# Patient Record
Sex: MALE | Race: WHITE | Employment: OTHER | URBAN - METROPOLITAN AREA
[De-identification: names, ages, dates, MRNs, and addresses within clinical notes are randomized per-mention and may not be internally consistent; named-entity substitution may affect disease eponyms.]

---

## 2017-05-30 ENCOUNTER — TRANSFERRED RECORDS (OUTPATIENT)
Dept: HEALTH INFORMATION MANAGEMENT | Facility: CLINIC | Age: 77
End: 2017-05-30

## 2017-06-22 ENCOUNTER — HOSPITAL ENCOUNTER (OUTPATIENT)
Dept: CARDIAC REHAB | Facility: CLINIC | Age: 77
End: 2017-06-22
Attending: PHYSICIAN ASSISTANT
Payer: MEDICARE

## 2017-06-22 VITALS — BODY MASS INDEX: 25.31 KG/M2 | HEIGHT: 68 IN | WEIGHT: 167 LBS

## 2017-06-22 PROCEDURE — 93797 PHYS/QHP OP CAR RHAB WO ECG: CPT | Mod: 59 | Performed by: REHABILITATION PRACTITIONER

## 2017-06-22 PROCEDURE — 40000116 ZZH STATISTIC OP CR VISIT: Performed by: REHABILITATION PRACTITIONER

## 2017-06-22 PROCEDURE — 40000575 ZZH STATISTIC OP CARDIAC VISIT #2: Performed by: REHABILITATION PRACTITIONER

## 2017-06-22 PROCEDURE — 93798 PHYS/QHP OP CAR RHAB W/ECG: CPT | Performed by: REHABILITATION PRACTITIONER

## 2017-06-22 ASSESSMENT — 6 MINUTE WALK TEST (6MWT)
FEMALE CALC: 1391.07
TOTAL DISTANCE WALKED (FT): 973
PREDICTED: 1611.69
MALE CALC: 1601.92
GENDER SELECTION: MALE

## 2017-06-22 NOTE — PROGRESS NOTES
Rod Harris  77 year old 06/22/17 1100   Session   Session Initial Evaluation and Exercise Prescription   Certified through this date 07/21/17   Cardiac Rehab Assessment   Cardiac Rehab Assessment Pt was diagnosed with ischemic three vessel coronary artery disease and had coronary artery bypass x4 with left internal mammary artery to LAD, saphenous vein to intermediate marginal, saphenous vein to first diagonal, saphenous vein from aorta to posterior descending branch of right. Pt has past medical history of hyperlipidemia, GERD, and suggested CAD with ischemic cardiomyopathy. Patient's main concern is his SOB, fatigue, his last hemaglobin was 9.1 which may help explain fatigue and low energy level. Pt. denies H/O depression does have flat affect and scores high on PHQ9 today. Skilled therapy is recommenSded for monitoring ekg changes with a stable cv response and incorporating education classes on exercise principles and nutrition    The patient's history and clinical status including hemodynamics and ECG were evaluated.  The patient was assessed to be stable and appropriate to begin exercise.   The patient's functional capacity and exercise prescription were determined by the completion of the 6 minute walk test.  See results below.  The patient was oriented to the program.  Risk factor profile was completed. Goals and objectives were discussed. CV response was WNL. No symptoms,  or pain were reported pt c/o fatigue. Good prognosis for reaching above goals.  Plan to progress to 30-40 minutes of exercise prior to discharge from cardiac rehab.  Initial THR of 20-30 beats above RHR; Effort rating of 4-6.  Initiate muscle conditioning as appropriate.  Provide risk factor education and behavior change counseling.    General Information   Treatment Diagnosis Coronary Artery Bypass Surgery   Date of Treatment Diagnosis 05/30/17   Secondary Treatment Diagnosis Other (see comments)   Comorbidities None   Other Medical  "History ASHD,Ischemic  cardiomyopathy, GERD, thrombocytopenia, acute pericarditis, acute blood loss as cause of postoperative anemia, hyperlipidemia, arthitis    Hospital Location [facility] Discharge Date 06/05/17   Signs and Symptoms Post Hospital Discharge Fatigue;Lightheadedness;Palpitations;SOB   Comments slient MI a yr ago, low hemoglobin    Outpatient Cardiac Rehab Start Date 06/22/17   Primary Physician Franklin Nowak   Primary Physician Follow Up Completed   Surgeon Dr. Uche Bowen   Surgeon Follow Up Scheduled   Cardiologist Dr. Wheeler   Cardiologist Follow Up Scheduled   Ejection Fraction 55-60%   Risk Stratification Low  (high PHQ9 score)   Living and Work Status    Living Arrangements and Social Status house   Support System Live with an adult   Preventative Medications   CMS recommended medications Ace inhibitors;Antiplatelets;Beta Blocker;Lipid Lowering;Influenza vaccination;Pneumonia vaccination   Falls Screen   Have you fallen two or more times in the past year? No   Have you fallen and had an injury in the past year? No   Pain   Patient Currently in Pain No   Physical Assessments   Incisions WNL   Edema +1 Trace  (in incision leg)   Right Lung Sounds not assessed   Left Lung Sounds not assessed   Limitations Surgical   Individualized Treatment Plan   Monitored Sessions Scheduled 24   Monitored Sessions Attended 1   Oxygen   Supplemental Oxygen needed No   Nutrition Management - Weight Management   Assessment Initial Assessment   Age 77   Weight 75.8 kg (167 lb)   Height 1.727 m (5' 8\")   BMI (Calculated) 25.45   Initial Rate Your Plate Score. Dietary tool to assess eating patterns. Scores range from 24 to 72. The higher the score the healthier the eating pattern. 51   Nutrition Management - Lipids   Lipids Labs Available   Date 05/08/17   Total Cholesterol 186   Triglycerides 87   HDL 56      Prescribed Lipid Medication Yes   Statin Intensity High Intensity   Nutrition " Management - Diabetes   Diabetes No   Nutrition Management Summary   Dietary Recommendations Low Fat;Low Cholesterol;Low Sodium   Stages of Change for Diet Compliance Contemplation   Interventions Planned Attend Nutrition Education Class(es);Educate on Benefits of Exercise   Patient Goals Goal #1   Goal #1 Description Pt. will attend a nutriition class to learn about heart healthy eating.    Goal #1 Target Date 09/22/17   Psychosocial Management   Psychosocial Assessment Initial   Is there history of clinical depression or increased risk of depression? No previous history;PHQ-9 score > 9   Current Level of Stress per Patient Report Severe   Current Coping Skills Has Positive Support System   Initial Patient Health Questionnaire -9 Score (PHQ-9) for depression. 5-9 Minimal symptoms, 10-14 Minor depression, 15-19 Major depression, moderately severe, > 20 Major depression, severe  12   Initial [facility] Survey score.  Quality of Life:   If total score > 25 review individual areas where patient rated a 4 or 5.  Consider patients current medical condition and what role that plays on the score.   Adjust treatment protocol to improve areas of concern.  Consider the following:  PHQ9 score, DASI, and re-assessment within the next 30 days to assist with developing treatments.  21   Stages of Change Pre-Contemplation   Other Core Components - Hypertension   History of or Diagnosis of Hypertension No   Other Core Components - Tobacco   History of Tobacco Use Never   Other Core Components Summary   Interventions Planned Instruct patient on the DASH diet;Attend education class on Blood Pressure   Activity/Exercise History   Activity/Exercise Assessment Initial   Activity/Exercise Status prior to event? Was Physically Active   Number of Days Currently participating in Moderate Physical Activity? 0   Number of Days Currently performing  Aerobic Exercise (including rehab)? 3   Number of Minutes per Session Currently of Aerobic  Exercise (average)? 25   Current Stage of Change (Physical Activity) Preparation   Current Stage of Change (Aerobic Exercise) Preparation   Patient Goals Goal #1;Goal #2   Goal #1 Description Pt. will walk at a moderate pace for at least 30 minutes with a stable cv response to exercise    Goal #1 Target Date 09/22/17   Goal #2 Description Pt. will establish muscle conditioning with a stable cv response to exercise.    Goal #2 Target Date 09/22/17   Exercise Assessment   6 Minute Walk Predicted - Gender Selection Male   6 Minute Walk Predicted (Male) 1601.92   6 Minute Walk Predicted (Female) 1391.07   Initial 6 Minute Walk Distance (Feet) 973 ft   Resting HR 70 bpm   Exercise HR 90 bpm   Post Exercise HR 72 bpm   Resting BP 98/60   Exercise /62   Post Exercise BP 94/62   Effort Rating 4   Current MET Level 2.4   MET Level Goal 5   ECG Rhythm Sinus rhythm   Ectopy PACs;PVCs   Current Symptoms Dyspnea;Fatigue   Limitations/Restrictions Sternal Precautions   Exercise Prescription   Mode Treadmill;Nustep;Weights   Duration/Time 15-30 min   Frequency 3 daysweek   THR (85% of age predicted max HR) 123.25   OMNI Effort Rating (0-10 Scale) 4-6/10   Progression Intermittent bouts;Aerobic exercise to OMNI rating of 6 or below and at or below THR   Recommended Home Exercise   Type of Exercise Walking   Frequency (days per week) 2-3x week on off days from rehab   Duration (minutes per session) Intermittent   Effort Rating Recommended 4-6/10   30 Day Exercise Plan progress duration to continuous bouts up to 30 min, then intensity per RPE and cv response   Current Home Exercise   Type of Exercise Walking   Frequency (days per week) daily   Duration (minutes per session) 15 min   Follow-up/On-going Support   Provider follow-up needed on the following No follow-up needed   Learning Assessment   Learner Patient   Primary Language English   Preferred Learning Style Listening;Reading;Demonstration;Pictures/Video   Barriers to  Learning No barriers noted   Patient Education   Education recommended Anatomy and Physiology of the Heart;Exercise Principles;Medication Overview;Muscle Conditioning;Nutrition;Stress Management   Physician cosignature/electronic signature indicates approval of this ITP document. I have established, reviewed and made necessary changes to the individualized treatment plan and exercise prescription for this patient.

## 2017-06-22 NOTE — PROGRESS NOTES
Rod Harris  77 year old 06/22/17 1100   Session   Session Initial Evaluation and Exercise Prescription   Certified through this date 07/21/17   Cardiac Rehab Assessment   Cardiac Rehab Assessment Pt was diagnosed with ischemic three vessel coronary artery disease and had coronary artery bypass x4 with left internal mammary artery to LAD, saphenous vein to intermediate marginal, saphenous vein to first diagonal, saphenous vein from aorta to posterior descending branch of right. Pt has past medical history of hyperlipidemia, GERD, and suggested CAD with ischemic cardiomyopathy. Patient's main concern is his SOB, fatigue, he states he is sleeping poorly, and his last hemaglobin was 9.1 which may help explain fatigue and low energy level. Pt. denies H/O depression does have flat affect and scores high on PHQ9 today. Skilled therapy is recommended for monitoring ekg changes with a stable cv response and incorporating education classes on exercise principles and nutrition    The patient's history and clinical status including hemodynamics and ECG were evaluated.  The patient was assessed to be stable and appropriate to begin exercise.   The patient's functional capacity and exercise prescription were determined by the completion of the 6 minute walk test.  See results below.  The patient was oriented to the program.  Risk factor profile was completed. Goals and objectives were discussed. CV response was WNL. No symptoms, or pain were reported but c/o fatigue. Good prognosis for reaching above goals.   Plan to progress to 30-40 minutes of exercise prior to discharge from cardiac rehab.  Initial THR of 20-30 beats above RHR; Effort rating of 4-6.  Initiate muscle conditioning as appropriate.  Provide risk factor education and behavior change counseling.      General Information   Treatment Diagnosis Coronary Artery Bypass Surgery   Date of Treatment Diagnosis 05/30/17   Secondary Treatment Diagnosis Other (see  "comments)   Comorbidities None   Other Medical History ASHD,Ischemic  cardiomyopathy, GERD, thrombocytopenia, acute pericarditis, acute blood loss as cause of postoperative anemia, hyperlipidemia, arthitis    Hospital Location [facility] Discharge Date 06/05/17   Signs and Symptoms Post Hospital Discharge Fatigue;Lightheadedness;Palpitations;SOB   Comments slient MI a yr ago, low hemoglobin    Outpatient Cardiac Rehab Start Date 06/22/17   Primary Physician Franklin Nowak   Primary Physician Follow Up Completed   Surgeon Dr. Uche Bowen   Surgeon Follow Up Scheduled   Cardiologist Dr. Wheeler   Cardiologist Follow Up Scheduled   Ejection Fraction 55-60%   Risk Stratification Low  (high PHQ9 score)   Living and Work Status    Living Arrangements and Social Status house   Support System Live with an adult   Preventative Medications   CMS recommended medications Ace inhibitors;Antiplatelets;Beta Blocker;Lipid Lowering;Influenza vaccination;Pneumonia vaccination   Falls Screen   Have you fallen two or more times in the past year? No   Have you fallen and had an injury in the past year? No   Pain   Patient Currently in Pain No   Physical Assessments   Incisions WNL   Edema +1 Trace  (in incision leg)   Right Lung Sounds not assessed   Left Lung Sounds not assessed   Limitations Surgical   Individualized Treatment Plan   Monitored Sessions Scheduled 24   Monitored Sessions Attended 1   Oxygen   Supplemental Oxygen needed No   Nutrition Management - Weight Management   Assessment Initial Assessment   Age 77   Weight 75.8 kg (167 lb)   Height 1.727 m (5' 8\")   BMI (Calculated) 25.45   Initial Rate Your Plate Score. Dietary tool to assess eating patterns. Scores range from 24 to 72. The higher the score the healthier the eating pattern. 51   Nutrition Management - Lipids   Lipids Labs Available   Date 05/08/17   Total Cholesterol 186   Triglycerides 87   HDL 56      Prescribed Lipid Medication Yes "   Statin Intensity High Intensity   Nutrition Management - Diabetes   Diabetes No   Nutrition Management Summary   Dietary Recommendations Low Fat;Low Cholesterol;Low Sodium   Stages of Change for Diet Compliance Contemplation   Interventions Planned Attend Nutrition Education Class(es);Educate on Benefits of Exercise   Patient Goals Goal #1   Goal #1 Description Pt. will attend a nutriition class to learn about heart healthy eating.    Goal #1 Target Date 09/22/17   Psychosocial Management   Psychosocial Assessment Initial   Current Level of Stress per Patient Report Severe   Current Coping Skills Has Positive Support System   Other Core Components - Hypertension   History of or Diagnosis of Hypertension No   Other Core Components - Tobacco   History of Tobacco Use Never   Other Core Components Summary   Interventions Planned Instruct patient on the DASH diet;Attend education class on Blood Pressure   Activity/Exercise History   Activity/Exercise Assessment Initial   Activity/Exercise Status prior to event? Was Physically Active   Number of Days Currently participating in Moderate Physical Activity? 0   Number of Days Currently performing  Aerobic Exercise (including rehab)? 3   Number of Minutes per Session Currently of Aerobic Exercise (average)? 25   Current Stage of Change (Physical Activity) Preparation   Current Stage of Change (Aerobic Exercise) Preparation   Patient Goals Goal #1;Goal #2   Goal #1 Description Pt. will walk at a moderate pace for at least 30 minutes with a stable cv response to exercise    Goal #1 Target Date 09/22/17   Goal #2 Description Pt. will establish muscle conditioning with a stable cv response to exercise.    Goal #2 Target Date 09/22/17   Exercise Assessment   6 Minute Walk Predicted (Male) 1601.92   6 Minute Walk Predicted (Female) 1391.07   Exercise Prescription   THR (85% of age predicted max HR) 123.25   Recommended Home Exercise   Type of Exercise Walking   Current Home  Exercise   Type of Exercise Walking   Patient Education   Education recommended Anatomy and Physiology of the Heart;Exercise Principles;Medication Overview;Muscle Conditioning;Nutrition;Stress Management   Physician cosignature/electronic signature indicates approval of this ITP document. I have established, reviewed and made necessary changes to the individualized treatment plan and exercise prescription for this patient.

## 2017-06-26 ENCOUNTER — HOSPITAL ENCOUNTER (OUTPATIENT)
Dept: CARDIAC REHAB | Facility: CLINIC | Age: 77
End: 2017-06-26
Attending: PHYSICIAN ASSISTANT
Payer: MEDICARE

## 2017-06-26 PROCEDURE — 40000116 ZZH STATISTIC OP CR VISIT: Performed by: CLINICAL EXERCISE PHYSIOLOGIST

## 2017-06-26 PROCEDURE — 93798 PHYS/QHP OP CAR RHAB W/ECG: CPT | Performed by: CLINICAL EXERCISE PHYSIOLOGIST

## 2017-06-28 ENCOUNTER — HOSPITAL ENCOUNTER (OUTPATIENT)
Dept: CARDIAC REHAB | Facility: CLINIC | Age: 77
End: 2017-06-28
Attending: PHYSICIAN ASSISTANT
Payer: MEDICARE

## 2017-06-28 VITALS — WEIGHT: 167 LBS | HEIGHT: 68 IN | BODY MASS INDEX: 25.31 KG/M2

## 2017-06-28 PROCEDURE — 40000116 ZZH STATISTIC OP CR VISIT: Performed by: CLINICAL EXERCISE PHYSIOLOGIST

## 2017-06-28 PROCEDURE — 93798 PHYS/QHP OP CAR RHAB W/ECG: CPT | Performed by: CLINICAL EXERCISE PHYSIOLOGIST

## 2017-06-28 ASSESSMENT — 6 MINUTE WALK TEST (6MWT)
PREDICTED: 1611.18
FEMALE CALC: 1390.93
GENDER SELECTION: MALE
MALE CALC: 1601.41
TOTAL DISTANCE WALKED (FT): 973

## 2017-06-28 NOTE — PROGRESS NOTES
06/28/17 1000   Session   Session 30 Day Individualized Treatment Plan   Certified through this date 08/02/17   Cardiac Rehab Assessment   Cardiac Rehab Assessment Pt was diagnosed with ischemic three vessel coronary artery disease and had coronary artery bypass x4 with left internal mammary artery to LAD, saphenous vein to intermediate marginal, saphenous vein to first diagonal, saphenous vein from aorta to posterior descending branch of right. Pt has past medical history of hyperlipidemia, GERD, and suggested CAD with ischemic cardiomyopathy. Patient's main concern is his SOB, fatigue, his last hemoglobin was 9.1 which may help explain fatigue and low energy level. Pt. denies H/O depression does have flat affect and scores high on PHQ9 today. Skilled therapy is recommended for monitoring EKG changes with a stable cv response and incorporating education classes on exercise principles and nutrition. 6/28 Patient has only attended a couple sessions of cardiac rehab. ITP forwarded for medical director review. Skilled therapy is recommended in order to monitor CV response to exercise and help the patient increase his exercise tolerance.   General Information   Treatment Diagnosis Coronary Artery Bypass Surgery   Date of Treatment Diagnosis 05/30/17   Secondary Treatment Diagnosis Other (see comments)   Comorbidities None   Other Medical History ASHD,Ischemic  cardiomyopathy, GERD, thrombocytopenia, acute pericarditis, acute blood loss as cause of postoperative anemia, hyperlipidemia, arthritis    Hospital Location [facility] Discharge Date 06/05/17   Signs and Symptoms Post Hospital Discharge Fatigue;Lightheadedness;Palpitations;SOB   Outpatient Cardiac Rehab Start Date 06/22/17   Primary Physician Franklin Nowak   Primary Physician Follow Up Completed   Surgeon Dr. Uche Bowen   Surgeon Follow Up Scheduled   Cardiologist Dr. Wheeler   Cardiologist Follow Up Scheduled   Ejection Fraction 55-60%  "  Risk Stratification Low  (high PHQ9 score)   Living and Work Status    Living Arrangements and Social Status house   Support System Live with an adult   Preventative Medications   CMS recommended medications Ace inhibitors;Antiplatelets;Beta Blocker;Lipid Lowering;Influenza vaccination;Pneumonia vaccination   Falls Screen   Have you fallen two or more times in the past year? No   Have you fallen and had an injury in the past year? No   Pain   Patient Currently in Pain No   Physical Assessments   Incisions Not assessed   Edema Not assessed   Right Lung Sounds not assessed   Left Lung Sounds not assessed   Limitations Surgical   Individualized Treatment Plan   Monitored Sessions Scheduled 24   Monitored Sessions Attended 2   Oxygen   Supplemental Oxygen needed No   Nutrition Management - Weight Management   Assessment Re-assessment   Age 77   Weight 75.8 kg (167 lb)   Height 1.727 m (5' 7.99\")   BMI (Calculated) 25.45   Initial Rate Your Plate Score. Dietary tool to assess eating patterns. Scores range from 24 to 72. The higher the score the healthier the eating pattern. 51   Nutrition Management - Lipids   Lipids Labs Available   Date 05/08/17   Total Cholesterol 186   Triglycerides 87   HDL 56      Prescribed Lipid Medication Yes   Statin Intensity High Intensity   Nutrition Management - Diabetes   Diabetes No   Nutrition Management Summary   Dietary Recommendations Low Fat;Low Cholesterol;Low Sodium   Stages of Change for Diet Compliance Contemplation   Interventions Planned Attend Nutrition Education Class(es);Educate on Benefits of Exercise   Patient Goals Goal #1   Goal #1 Description Pt. will attend a nutrition class to learn about heart healthy eating.    Goal #1 Target Date 09/22/17   Psychosocial Management   Psychosocial Assessment Re-assessment   Is there history of clinical depression or increased risk of depression? No previous history;PHQ-9 score > 9   Current Level of Stress per Patient Report " Severe   Current Coping Skills Has Positive Support System   Initial Patient Health Questionnaire -9 Score (PHQ-9) for depression. 5-9 Minimal symptoms, 10-14 Minor depression, 15-19 Major depression, moderately severe, > 20 Major depression, severe  12   Initial [facility] Survey score.  Quality of Life:   If total score > 25 review individual areas where patient rated a 4 or 5.  Consider patients current medical condition and what role that plays on the score.   Adjust treatment protocol to improve areas of concern.  Consider the following:  PHQ9 score, DASI, and re-assessment within the next 30 days to assist with developing treatments.  21   Stages of Change Pre-Contemplation   Other Core Components - Hypertension   History of or Diagnosis of Hypertension No   Other Core Components - Tobacco   History of Tobacco Use Never   Other Core Components Summary   Interventions Planned Instruct patient on the DASH diet;Attend education class on Blood Pressure   Activity/Exercise History   Activity/Exercise Assessment Re-assessment   Activity/Exercise Status prior to event? Was Physically Active   Number of Days Currently participating in Moderate Physical Activity? 0   Number of Days Currently performing  Aerobic Exercise (including rehab)? 3   Number of Minutes per Session Currently of Aerobic Exercise (average)? 25   Current Stage of Change (Physical Activity) Preparation   Current Stage of Change (Aerobic Exercise) Preparation   Patient Goals Goal #1;Goal #2   Goal #1 Description Pt. will walk at a moderate pace for at least 30 minutes with a stable cv response to exercise    Goal #1 Target Date 09/22/17   Goal #2 Description Pt. will establish muscle conditioning with a stable cv response to exercise.    Goal #2 Target Date 09/22/17   Exercise Assessment   6 Minute Walk Predicted - Gender Selection Male   6 Minute Walk Predicted (Male) 1601.41   6 Minute Walk Predicted (Female) 1390.93   Initial 6 Minute Walk  Distance (Feet) 973 ft   Resting HR 70 bpm   Exercise HR 90 bpm   Post Exercise HR 72 bpm   Resting BP 98/60   Exercise /62   Post Exercise BP 94/62   Effort Rating 4   Current MET Level 2.4   MET Level Goal 5   ECG Rhythm Sinus rhythm   Ectopy PACs;PVCs   Current Symptoms Dyspnea;Fatigue   Limitations/Restrictions Sternal Precautions   Exercise Prescription   Mode Treadmill;Nustep;Weights   Duration/Time 15-30 min   Frequency 3 daysweek   THR (85% of age predicted max HR) 123.25   OMNI Effort Rating (0-10 Scale) 4-6/10   Progression Intermittent bouts;Aerobic exercise to OMNI rating of 6 or below and at or below THR   Recommended Home Exercise   Type of Exercise Walking   Frequency (days per week) 2-3x week on off days from rehab   Duration (minutes per session) Intermittent   Effort Rating Recommended 4-6/10   30 Day Exercise Plan progress duration to continuous bouts up to 30 min, then intensity per RPE and cv response   Current Home Exercise   Type of Exercise Walking   Frequency (days per week) daily   Duration (minutes per session) 15 min   Follow-up/On-going Support   Provider follow-up needed on the following No follow-up needed   Learning Assessment   Learner Patient   Primary Language English   Preferred Learning Style Listening;Reading;Demonstration;Pictures/Video   Barriers to Learning No barriers noted   Patient Education   Education recommended Anatomy and Physiology of the Heart;Exercise Principles;Medication Overview;Muscle Conditioning;Nutrition;Stress Management   Physician cosignature/electronic signature indicates approval of this ITP document. I have established, reviewed and made necessary changes to the individualized treatment plan and exercise prescription for this patient.

## 2017-06-30 ENCOUNTER — HOSPITAL ENCOUNTER (OUTPATIENT)
Dept: CARDIAC REHAB | Facility: CLINIC | Age: 77
End: 2017-06-30
Attending: PHYSICIAN ASSISTANT
Payer: MEDICARE

## 2017-06-30 PROCEDURE — 40000116 ZZH STATISTIC OP CR VISIT: Performed by: CLINICAL EXERCISE PHYSIOLOGIST

## 2017-06-30 PROCEDURE — 93798 PHYS/QHP OP CAR RHAB W/ECG: CPT | Performed by: CLINICAL EXERCISE PHYSIOLOGIST

## 2017-07-03 ENCOUNTER — HOSPITAL ENCOUNTER (OUTPATIENT)
Dept: CARDIAC REHAB | Facility: CLINIC | Age: 77
End: 2017-07-03
Attending: PHYSICIAN ASSISTANT
Payer: MEDICARE

## 2017-07-03 PROCEDURE — 40000116 ZZH STATISTIC OP CR VISIT

## 2017-07-03 PROCEDURE — 93798 PHYS/QHP OP CAR RHAB W/ECG: CPT

## 2017-07-05 ENCOUNTER — HOSPITAL ENCOUNTER (OUTPATIENT)
Dept: CARDIAC REHAB | Facility: CLINIC | Age: 77
End: 2017-07-05
Attending: PHYSICIAN ASSISTANT
Payer: MEDICARE

## 2017-07-05 PROCEDURE — 93798 PHYS/QHP OP CAR RHAB W/ECG: CPT | Performed by: REHABILITATION PRACTITIONER

## 2017-07-05 PROCEDURE — 40000116 ZZH STATISTIC OP CR VISIT: Performed by: REHABILITATION PRACTITIONER

## 2017-07-05 PROCEDURE — 40000575 ZZH STATISTIC OP CARDIAC VISIT #2: Performed by: REHABILITATION PRACTITIONER

## 2017-07-05 PROCEDURE — 93797 PHYS/QHP OP CAR RHAB WO ECG: CPT | Mod: 59 | Performed by: REHABILITATION PRACTITIONER

## 2017-07-07 ENCOUNTER — HOSPITAL ENCOUNTER (OUTPATIENT)
Dept: CARDIAC REHAB | Facility: CLINIC | Age: 77
End: 2017-07-07
Attending: PHYSICIAN ASSISTANT
Payer: MEDICARE

## 2017-07-07 PROCEDURE — 93798 PHYS/QHP OP CAR RHAB W/ECG: CPT | Performed by: CLINICAL EXERCISE PHYSIOLOGIST

## 2017-07-07 PROCEDURE — 40000116 ZZH STATISTIC OP CR VISIT: Performed by: CLINICAL EXERCISE PHYSIOLOGIST

## 2017-07-10 ENCOUNTER — HOSPITAL ENCOUNTER (OUTPATIENT)
Dept: CARDIAC REHAB | Facility: CLINIC | Age: 77
End: 2017-07-10
Attending: PHYSICIAN ASSISTANT
Payer: MEDICARE

## 2017-07-10 PROCEDURE — 93798 PHYS/QHP OP CAR RHAB W/ECG: CPT | Performed by: CLINICAL EXERCISE PHYSIOLOGIST

## 2017-07-10 PROCEDURE — 93797 PHYS/QHP OP CAR RHAB WO ECG: CPT | Performed by: CLINICAL EXERCISE PHYSIOLOGIST

## 2017-07-10 PROCEDURE — 40000116 ZZH STATISTIC OP CR VISIT: Performed by: CLINICAL EXERCISE PHYSIOLOGIST

## 2017-07-10 PROCEDURE — 40000575 ZZH STATISTIC OP CARDIAC VISIT #2: Performed by: CLINICAL EXERCISE PHYSIOLOGIST

## 2017-07-12 ENCOUNTER — HOSPITAL ENCOUNTER (OUTPATIENT)
Dept: CARDIAC REHAB | Facility: CLINIC | Age: 77
End: 2017-07-12
Attending: PHYSICIAN ASSISTANT
Payer: MEDICARE

## 2017-07-12 PROCEDURE — 40000575 ZZH STATISTIC OP CARDIAC VISIT #2: Performed by: CLINICAL EXERCISE PHYSIOLOGIST

## 2017-07-12 PROCEDURE — 93797 PHYS/QHP OP CAR RHAB WO ECG: CPT | Mod: 59 | Performed by: CLINICAL EXERCISE PHYSIOLOGIST

## 2017-07-12 PROCEDURE — 40000116 ZZH STATISTIC OP CR VISIT: Performed by: CLINICAL EXERCISE PHYSIOLOGIST

## 2017-07-12 PROCEDURE — 93798 PHYS/QHP OP CAR RHAB W/ECG: CPT | Performed by: CLINICAL EXERCISE PHYSIOLOGIST

## 2017-07-14 ENCOUNTER — HOSPITAL ENCOUNTER (OUTPATIENT)
Dept: CARDIAC REHAB | Facility: CLINIC | Age: 77
End: 2017-07-14
Attending: PHYSICIAN ASSISTANT
Payer: MEDICARE

## 2017-07-14 PROCEDURE — 40000116 ZZH STATISTIC OP CR VISIT

## 2017-07-14 PROCEDURE — 93798 PHYS/QHP OP CAR RHAB W/ECG: CPT

## 2017-07-17 ENCOUNTER — HOSPITAL ENCOUNTER (OUTPATIENT)
Dept: CARDIAC REHAB | Facility: CLINIC | Age: 77
End: 2017-07-17
Attending: PHYSICIAN ASSISTANT
Payer: MEDICARE

## 2017-07-17 PROCEDURE — 40000116 ZZH STATISTIC OP CR VISIT: Performed by: CLINICAL EXERCISE PHYSIOLOGIST

## 2017-07-17 PROCEDURE — 93798 PHYS/QHP OP CAR RHAB W/ECG: CPT | Performed by: CLINICAL EXERCISE PHYSIOLOGIST

## 2017-07-17 PROCEDURE — 93797 PHYS/QHP OP CAR RHAB WO ECG: CPT | Mod: 59 | Performed by: CLINICAL EXERCISE PHYSIOLOGIST

## 2017-07-17 PROCEDURE — 40000575 ZZH STATISTIC OP CARDIAC VISIT #2: Performed by: CLINICAL EXERCISE PHYSIOLOGIST

## 2017-07-19 ENCOUNTER — HOSPITAL ENCOUNTER (OUTPATIENT)
Dept: CARDIAC REHAB | Facility: CLINIC | Age: 77
End: 2017-07-19
Attending: PHYSICIAN ASSISTANT
Payer: MEDICARE

## 2017-07-19 PROCEDURE — 40000116 ZZH STATISTIC OP CR VISIT: Performed by: CLINICAL EXERCISE PHYSIOLOGIST

## 2017-07-19 PROCEDURE — 93797 PHYS/QHP OP CAR RHAB WO ECG: CPT | Performed by: CLINICAL EXERCISE PHYSIOLOGIST

## 2017-07-19 PROCEDURE — 40000575 ZZH STATISTIC OP CARDIAC VISIT #2: Performed by: CLINICAL EXERCISE PHYSIOLOGIST

## 2017-07-19 PROCEDURE — 93798 PHYS/QHP OP CAR RHAB W/ECG: CPT | Performed by: CLINICAL EXERCISE PHYSIOLOGIST

## 2017-07-21 ENCOUNTER — HOSPITAL ENCOUNTER (OUTPATIENT)
Dept: CARDIAC REHAB | Facility: CLINIC | Age: 77
End: 2017-07-21
Attending: PHYSICIAN ASSISTANT
Payer: MEDICARE

## 2017-07-21 VITALS — BODY MASS INDEX: 25.61 KG/M2 | WEIGHT: 169 LBS | HEIGHT: 68 IN

## 2017-07-21 PROCEDURE — 40000575 ZZH STATISTIC OP CARDIAC VISIT #2: Performed by: REHABILITATION PRACTITIONER

## 2017-07-21 PROCEDURE — 93798 PHYS/QHP OP CAR RHAB W/ECG: CPT | Performed by: REHABILITATION PRACTITIONER

## 2017-07-21 PROCEDURE — 93797 PHYS/QHP OP CAR RHAB WO ECG: CPT | Performed by: REHABILITATION PRACTITIONER

## 2017-07-21 PROCEDURE — 40000116 ZZH STATISTIC OP CR VISIT: Performed by: REHABILITATION PRACTITIONER

## 2017-07-21 ASSESSMENT — 6 MINUTE WALK TEST (6MWT)
PREDICTED: 1605.9
FEMALE CALC: 1384.1
TOTAL DISTANCE WALKED (FT): 973
GENDER SELECTION: MALE
MALE CALC: 1596.17

## 2017-07-21 NOTE — PROGRESS NOTES
" 07/21/17 1400   Session   Session 60 Day Individualized Treatment Plan  (1:1)   Certified through this date 08/02/17   Cardiac Rehab Assessment   Cardiac Rehab Assessment Pt was diagnosed with ischemic three vessel coronary artery disease and had coronary artery bypass x4 with left internal mammary artery to LAD, saphenous vein to intermediate marginal, saphenous vein to first diagonal, saphenous vein from aorta to posterior descending branch of right. Pt has past medical history of hyperlipidemia, GERD, and suggested CAD with ischemic cardiomyopathy. Patient's main concern is his SOB, fatigue, his last hemaglobin was 9.1 which may help explain fatigue and low energy level. Pt. denies H/O depression does have flat affect and scores high on PHQ9 today. Skilled therapy is recommended for monitoring ekg changes with a stable cv response and incorporating education classes on exercise principles and nutrition. 6/28 Patient has only attended a couple sessions of cardiac rehab. ITP forwarded for medical director review. Skilled therapy is recommended in order to monitor CV response to exercise and help the patient increase his exercise tolerance. 7/21  1:1 update, pt states he has come to all the ed so far though \"not finding them particularly helpful\". He is pleased with his progress has noticed that he has had improvement in his tolerance with ADLs, noticing that he can now take a shower without the fatigue he felt the 1st couple weeks after surgery. Pt. would like to advance his ex tolerance to eliminate dyspnea with stairs and have more energy at end of day. Skilled services to advance his goals to include that improvement, provide ed. that is meaningful to him ie simple diet changes and BP management. Pt declined retaking the PHQ9 as he is sure it will not change due to his home situation, his wife has multiple health problems that take much of his time and energy.    General Information   Treatment Diagnosis " "Coronary Artery Bypass Surgery   Date of Treatment Diagnosis 05/30/17   Secondary Treatment Diagnosis Other (see comments)   Comorbidities None   Other Medical History ASHD,Ischemic  cardiomyopathy, GERD, thrombocytopenia, acute pericarditis, acute blood loss as cause of postoperative anemia, hyperlipidemia, arthitis    Hospital Location [facility] Discharge Date 06/05/17   Signs and Symptoms Post Hospital Discharge Fatigue;Lightheadedness;Palpitations;SOB   Outpatient Cardiac Rehab Start Date 06/22/17   Primary Physician Franklin Nowak   Primary Physician Follow Up Completed   Surgeon Dr. Uche Bowen   Surgeon Follow Up Scheduled   Cardiologist Dr. Wheeler   Cardiologist Follow Up Scheduled   Ejection Fraction 55-60%   Risk Stratification Low  (high PHQ9 score)   Living and Work Status    Living Arrangements and Social Status house   Support System Live with an adult   Preventative Medications   CMS recommended medications Ace inhibitors;Antiplatelets;Beta Blocker;Lipid Lowering;Influenza vaccination;Pneumonia vaccination   Falls Screen   Have you fallen two or more times in the past year? No   Have you fallen and had an injury in the past year? No   Pain   Patient Currently in Pain No   Physical Assessments   Incisions Not assessed   Edema Not assessed   Right Lung Sounds not assessed   Left Lung Sounds not assessed   Limitations Surgical   Individualized Treatment Plan   Monitored Sessions Scheduled 24   Monitored Sessions Attended 12   Oxygen   Supplemental Oxygen needed No   Nutrition Management - Weight Management   Assessment Re-assessment   Age 77   Weight 76.7 kg (169 lb)   Height 1.727 m (5' 7.99\")   BMI (Calculated) 25.76   Initial Rate Your Plate Score. Dietary tool to assess eating patterns. Scores range from 24 to 72. The higher the score the healthier the eating pattern. 51   Nutrition Management - Lipids   Lipids Labs Available   Date 05/08/17   Total Cholesterol 186 "   Triglycerides 87   HDL 56      Prescribed Lipid Medication Yes   Statin Intensity High Intensity   Nutrition Management - Diabetes   Diabetes No   Nutrition Management Summary   Dietary Recommendations Low Fat;Low Cholesterol;Low Sodium   Stages of Change for Diet Compliance Contemplation   Interventions Planned Attend Nutrition Education Class(es);Educate on Benefits of Exercise   Patient Goals Goal #1   Goal #1 Description Pt. will attend a nutriition class to learn about heart healthy eating.    Goal #1 Target Date 09/22/17   Goal #1 Progress Towards Goal 7/21 Pt has atteneded 1 diet class, states he did not learn anything he didn't already know. Plans to come to 2nd class, suggested he come list of questions.    Psychosocial Management   Psychosocial Assessment Re-assessment   Is there history of clinical depression or increased risk of depression? No previous history;PHQ-9 score > 9   Current Level of Stress per Patient Report Severe   Current Coping Skills Has Positive Support System   Initial Patient Health Questionnaire -9 Score (PHQ-9) for depression. 5-9 Minimal symptoms, 10-14 Minor depression, 15-19 Major depression, moderately severe, > 20 Major depression, severe  12   Reassessment PHQ-9 Score for Depression (pt declined re-taking survey)   Initial [facility] Survey score.  Quality of Life:   If total score > 25 review individual areas where patient rated a 4 or 5.  Consider patients current medical condition and what role that plays on the score.   Adjust treatment protocol to improve areas of concern.  Consider the following:  PHQ9 score, DASI, and re-assessment within the next 30 days to assist with developing treatments.  21   Stages of Change Pre-Contemplation   Other Core Components - Hypertension   History of or Diagnosis of Hypertension No   Other Core Components - Tobacco   History of Tobacco Use Never   Other Core Components Summary   Interventions Planned Instruct patient on the  DASH diet;Attend education class on Blood Pressure   Activity/Exercise History   Activity/Exercise Assessment Re-assessment   Activity/Exercise Status prior to event? Was Physically Active   Number of Days Currently participating in Moderate Physical Activity? 3   Number of Days Currently performing  Aerobic Exercise (including rehab)? 4   Number of Minutes per Session Currently of Aerobic Exercise (average)? 25   Current Stage of Change (Physical Activity) Preparation   Current Stage of Change (Aerobic Exercise) Preparation   Patient Goals Goal #1;Goal #2   Goal #1 Description Pt. will walk at a moderate pace for at least 30 minutes and progress exertion to level that stair climbing is not challenging, as well as to decr. fatigue at end of day, with a stable cv response to exercise    Goal #1 Target Date 09/22/17   Goal #1 Progress Towards Goal 7/21 Pt has made good gains in ex. tolerance started at 2.5METs now at ~4METs.   Goal #2 Description Pt. will establish indep muscle conditioning with a stable cv response to exercise.    Goal #2 Target Date 09/22/17   Goal #2 Progress Towards Goal 7/21Pt has started str. training ex. in program but not indep yet.    Exercise Assessment   6 Minute Walk Predicted - Gender Selection Male   6 Minute Walk Predicted (Male) 1596.17   6 Minute Walk Predicted (Female) 1384.1   Initial 6 Minute Walk Distance (Feet) 973 ft   Resting HR 70 bpm   Exercise HR 90 bpm   Post Exercise HR 72 bpm   Resting BP 98/60   Exercise /62   Post Exercise BP 94/62   Effort Rating 4   Current MET Level 4.0   MET Level Goal 5   ECG Rhythm Sinus rhythm   Ectopy PACs;PVCs   Current Symptoms Dyspnea;Fatigue   Limitations/Restrictions Sternal Precautions   Exercise Prescription   Mode Treadmill;Nustep;Weights   Duration/Time 15-30 min   Frequency 3 daysweek   THR (85% of age predicted max HR) 123.25   OMNI Effort Rating (0-10 Scale) 4-6/10   Progression Intermittent bouts;Aerobic exercise to OMNI  rating of 6 or below and at or below THR   Recommended Home Exercise   Type of Exercise Walking   Frequency (days per week) 2-3x week on off days from rehab   Duration (minutes per session) Intermittent   Effort Rating Recommended 4-6/10   30 Day Exercise Plan progress duration to continuous bouts up to 30 min, then intensity per RPE and cv response   Current Home Exercise   Type of Exercise Walking   Frequency (days per week) daily   Duration (minutes per session) 15 min   Follow-up/On-going Support   Provider follow-up needed on the following No follow-up needed   Learning Assessment   Learner Patient   Primary Language English   Preferred Learning Style Listening;Reading;Demonstration;Pictures/Video   Barriers to Learning No barriers noted   Patient Education   Education recommended Anatomy and Physiology of the Heart;Exercise Principles;Medication Overview;Muscle Conditioning;Nutrition;Stress Management   Physician cosignature/electronic signature indicates approval of this ITP document. I have established, reviewed and made necessary changes to the individualized treatment plan and exercise prescription for this patient.

## 2017-07-24 ENCOUNTER — HOSPITAL ENCOUNTER (OUTPATIENT)
Dept: CARDIAC REHAB | Facility: CLINIC | Age: 77
End: 2017-07-24
Attending: PHYSICIAN ASSISTANT
Payer: MEDICARE

## 2017-07-24 PROCEDURE — 40000116 ZZH STATISTIC OP CR VISIT

## 2017-07-24 PROCEDURE — 93798 PHYS/QHP OP CAR RHAB W/ECG: CPT

## 2017-07-24 PROCEDURE — 40000575 ZZH STATISTIC OP CARDIAC VISIT #2

## 2017-07-24 PROCEDURE — 93797 PHYS/QHP OP CAR RHAB WO ECG: CPT | Mod: 59

## 2017-07-26 ENCOUNTER — HOSPITAL ENCOUNTER (OUTPATIENT)
Dept: CARDIAC REHAB | Facility: CLINIC | Age: 77
End: 2017-07-26
Attending: PHYSICIAN ASSISTANT
Payer: MEDICARE

## 2017-07-26 PROCEDURE — 40000116 ZZH STATISTIC OP CR VISIT: Performed by: CLINICAL EXERCISE PHYSIOLOGIST

## 2017-07-26 PROCEDURE — 93797 PHYS/QHP OP CAR RHAB WO ECG: CPT | Mod: 59 | Performed by: CLINICAL EXERCISE PHYSIOLOGIST

## 2017-07-26 PROCEDURE — 40000575 ZZH STATISTIC OP CARDIAC VISIT #2: Performed by: CLINICAL EXERCISE PHYSIOLOGIST

## 2017-07-26 PROCEDURE — 93798 PHYS/QHP OP CAR RHAB W/ECG: CPT | Performed by: CLINICAL EXERCISE PHYSIOLOGIST

## 2017-07-28 ENCOUNTER — HOSPITAL ENCOUNTER (OUTPATIENT)
Dept: CARDIAC REHAB | Facility: CLINIC | Age: 77
End: 2017-07-28
Attending: PHYSICIAN ASSISTANT
Payer: MEDICARE

## 2017-07-28 PROCEDURE — 93798 PHYS/QHP OP CAR RHAB W/ECG: CPT | Performed by: CLINICAL EXERCISE PHYSIOLOGIST

## 2017-07-28 PROCEDURE — 40000116 ZZH STATISTIC OP CR VISIT: Performed by: CLINICAL EXERCISE PHYSIOLOGIST

## 2017-07-31 ENCOUNTER — HOSPITAL ENCOUNTER (OUTPATIENT)
Dept: CARDIAC REHAB | Facility: CLINIC | Age: 77
End: 2017-07-31
Attending: PHYSICIAN ASSISTANT
Payer: MEDICARE

## 2017-07-31 PROCEDURE — 40000116 ZZH STATISTIC OP CR VISIT: Performed by: CLINICAL EXERCISE PHYSIOLOGIST

## 2017-07-31 PROCEDURE — 93798 PHYS/QHP OP CAR RHAB W/ECG: CPT | Performed by: CLINICAL EXERCISE PHYSIOLOGIST

## 2017-08-02 ENCOUNTER — HOSPITAL ENCOUNTER (OUTPATIENT)
Dept: CARDIAC REHAB | Facility: CLINIC | Age: 77
End: 2017-08-02
Attending: PHYSICIAN ASSISTANT
Payer: MEDICARE

## 2017-08-02 PROCEDURE — 40000116 ZZH STATISTIC OP CR VISIT: Performed by: REHABILITATION PRACTITIONER

## 2017-08-02 PROCEDURE — 93798 PHYS/QHP OP CAR RHAB W/ECG: CPT | Performed by: REHABILITATION PRACTITIONER

## 2017-08-04 ENCOUNTER — HOSPITAL ENCOUNTER (OUTPATIENT)
Dept: CARDIAC REHAB | Facility: CLINIC | Age: 77
End: 2017-08-04
Attending: PHYSICIAN ASSISTANT
Payer: MEDICARE

## 2017-08-04 PROCEDURE — 93798 PHYS/QHP OP CAR RHAB W/ECG: CPT | Performed by: CLINICAL EXERCISE PHYSIOLOGIST

## 2017-08-04 PROCEDURE — 40000116 ZZH STATISTIC OP CR VISIT: Performed by: CLINICAL EXERCISE PHYSIOLOGIST

## 2017-08-07 ENCOUNTER — HOSPITAL ENCOUNTER (OUTPATIENT)
Dept: CARDIAC REHAB | Facility: CLINIC | Age: 77
End: 2017-08-07
Attending: PHYSICIAN ASSISTANT
Payer: MEDICARE

## 2017-08-07 PROCEDURE — 93798 PHYS/QHP OP CAR RHAB W/ECG: CPT | Performed by: CLINICAL EXERCISE PHYSIOLOGIST

## 2017-08-07 PROCEDURE — 40000116 ZZH STATISTIC OP CR VISIT: Performed by: CLINICAL EXERCISE PHYSIOLOGIST

## 2017-08-09 ENCOUNTER — HOSPITAL ENCOUNTER (OUTPATIENT)
Dept: CARDIAC REHAB | Facility: CLINIC | Age: 77
End: 2017-08-09
Attending: PHYSICIAN ASSISTANT
Payer: MEDICARE

## 2017-08-09 PROCEDURE — 40000116 ZZH STATISTIC OP CR VISIT: Performed by: CLINICAL EXERCISE PHYSIOLOGIST

## 2017-08-09 PROCEDURE — 93798 PHYS/QHP OP CAR RHAB W/ECG: CPT | Performed by: CLINICAL EXERCISE PHYSIOLOGIST

## 2017-08-11 ENCOUNTER — HOSPITAL ENCOUNTER (OUTPATIENT)
Dept: CARDIAC REHAB | Facility: CLINIC | Age: 77
End: 2017-08-11
Attending: PHYSICIAN ASSISTANT
Payer: MEDICARE

## 2017-08-11 PROCEDURE — 40000116 ZZH STATISTIC OP CR VISIT: Performed by: CLINICAL EXERCISE PHYSIOLOGIST

## 2017-08-11 PROCEDURE — 93798 PHYS/QHP OP CAR RHAB W/ECG: CPT | Performed by: CLINICAL EXERCISE PHYSIOLOGIST

## 2017-08-14 ENCOUNTER — HOSPITAL ENCOUNTER (OUTPATIENT)
Dept: CARDIAC REHAB | Facility: CLINIC | Age: 77
End: 2017-08-14
Attending: PHYSICIAN ASSISTANT
Payer: MEDICARE

## 2017-08-14 PROCEDURE — 40000116 ZZH STATISTIC OP CR VISIT: Performed by: CLINICAL EXERCISE PHYSIOLOGIST

## 2017-08-14 PROCEDURE — 93798 PHYS/QHP OP CAR RHAB W/ECG: CPT | Performed by: CLINICAL EXERCISE PHYSIOLOGIST

## 2017-08-16 ENCOUNTER — HOSPITAL ENCOUNTER (OUTPATIENT)
Dept: CARDIAC REHAB | Facility: CLINIC | Age: 77
End: 2017-08-16
Attending: PHYSICIAN ASSISTANT
Payer: MEDICARE

## 2017-08-16 PROCEDURE — 93798 PHYS/QHP OP CAR RHAB W/ECG: CPT | Performed by: REHABILITATION PRACTITIONER

## 2017-08-16 PROCEDURE — 40000116 ZZH STATISTIC OP CR VISIT: Performed by: REHABILITATION PRACTITIONER

## 2017-08-18 ENCOUNTER — HOSPITAL ENCOUNTER (OUTPATIENT)
Dept: CARDIAC REHAB | Facility: CLINIC | Age: 77
End: 2017-08-18
Attending: PHYSICIAN ASSISTANT
Payer: MEDICARE

## 2017-08-18 VITALS — WEIGHT: 168 LBS | HEIGHT: 68 IN | BODY MASS INDEX: 25.46 KG/M2

## 2017-08-18 PROCEDURE — 93797 PHYS/QHP OP CAR RHAB WO ECG: CPT | Performed by: REHABILITATION PRACTITIONER

## 2017-08-18 PROCEDURE — 40000575 ZZH STATISTIC OP CARDIAC VISIT #2: Performed by: REHABILITATION PRACTITIONER

## 2017-08-18 PROCEDURE — 40000116 ZZH STATISTIC OP CR VISIT: Performed by: REHABILITATION PRACTITIONER

## 2017-08-18 PROCEDURE — 93798 PHYS/QHP OP CAR RHAB W/ECG: CPT | Performed by: REHABILITATION PRACTITIONER

## 2017-08-18 ASSESSMENT — 6 MINUTE WALK TEST (6MWT)
FEMALE CALC: 1387.52
MALE CALC: 1598.79
PREDICTED: 1608.54
TOTAL DISTANCE WALKED (FT): 973
GENDER SELECTION: MALE
TOTAL DISTANCE WALKED (FT): 1659

## 2017-08-18 NOTE — PROGRESS NOTES
"Physician cosignature/electronic signature indicates approval of this ITP document. I have established, reviewed and made necessary changes to the individualized treatment plan and exercise prescription for this patient.   08/18/17 1300   Session   Session Discharge Note   Certified through this date 08/28/17   Cardiac Rehab Assessment   Cardiac Rehab Assessment Pt made good gains in all outcomes. Mood appears brighter, he discussed that he and his wife got a new puppy that is giving him a lot of fer, and he states it gives them something to focus on in place of only their health problems. 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   Pt made significant gains in exercise tolerance. Initially patient tolerated 20 minutes intermittently at 2.3METs, now tolerating 30 minutes at 4.9METs. Patient also increased 6-minute walk test by 70% (an increase of 686 feet.) The PT was given instructions on frequency, intensity, and duration for continued exercise as well as muscle conditioning and stretching exercises.  Your PT plans to return to his [facility] with above instructions, and walk his puppy.    General Information   Treatment Diagnosis Coronary Artery Bypass Surgery   Date of Treatment Diagnosis 05/30/17   Secondary Treatment Diagnosis Other (see comments)   Comorbidities None   Other Medical History ASHD,Ischemic  cardiomyopathy, GERD, thrombocytopenia, acute pericarditis, acute blood loss as cause of postoperative anemia, hyperlipidemia, arthitis    Hospital Location [facility] Discharge Date 06/05/17   Signs and Symptoms Post Hospital Discharge Fatigue;Lightheadedness;Palpitations;SOB   Outpatient Cardiac Rehab Start Date 06/22/17   Primary Physician Franklin Nowak   Primary Physician Follow Up Completed   Surgeon Dr. Uche Bowen   Surgeon Follow Up Scheduled   Cardiologist Dr. Wheeler   Cardiologist Follow Up Scheduled   Ejection Fraction " "55-60%   Risk Stratification Low  (high PHQ9 score)   Living and Work Status    Living Arrangements and Social Status house   Support System Live with an adult   Preventative Medications   CMS recommended medications Ace inhibitors;Antiplatelets;Beta Blocker;Lipid Lowering;Influenza vaccination;Pneumonia vaccination   Falls Screen   Have you fallen two or more times in the past year? No   Have you fallen and had an injury in the past year? No   Pain   Patient Currently in Pain No   Physical Assessments   Incisions Not assessed   Edema Not assessed   Right Lung Sounds not assessed   Left Lung Sounds not assessed   Limitations Surgical   Individualized Treatment Plan   Monitored Sessions Scheduled 24   Monitored Sessions Attended 25   Oxygen   Supplemental Oxygen needed No   Nutrition Management - Weight Management   Assessment Re-assessment   Age 77   Weight 76.2 kg (168 lb)   Height 1.727 m (5' 7.99\")   BMI (Calculated) 25.6   Initial Rate Your Plate Score. Dietary tool to assess eating patterns. Scores range from 24 to 72. The higher the score the healthier the eating pattern. 51   Discharge Rate Your Plate Score 54   Nutrition Management - Lipids   Lipids Labs Available   Date 05/08/17   Total Cholesterol 186   Triglycerides 87   HDL 56      Prescribed Lipid Medication Yes   Statin Intensity High Intensity   Nutrition Management - Diabetes   Diabetes No   Nutrition Management Summary   Dietary Recommendations Low Fat;Low Cholesterol;Low Sodium   Stages of Change for Diet Compliance Contemplation   Interventions Planned Attend Nutrition Education Class(es);Educate on Benefits of Exercise   Patient Goals Goal #1   Goal #1 Description Pt. will attend a nutriition class to learn about heart healthy eating.    Goal #1 Target Date 09/22/17   Goal #1 Date Met 08/18/17   Goal #1 Progress Towards Goal 7/21 Pt has atteneded 1 diet class, states he did not learn anything he didn't already know. Plans to come to 2nd " class, suggested he come list of questions. 8/18 pt discussed diet ed. again today, with many favorable comments.    Psychosocial Management   Psychosocial Assessment Re-assessment   Is there history of clinical depression or increased risk of depression? No previous history;PHQ-9 score > 9   Current Level of Stress per Patient Report Severe   Current Coping Skills Has Positive Support System   Initial Patient Health Questionnaire -9 Score (PHQ-9) for depression. 5-9 Minimal symptoms, 10-14 Minor depression, 15-19 Major depression, moderately severe, > 20 Major depression, severe  12   Reassessment PHQ-9 Score for Depression (pt declined re-taking survey)   Discharge PHQ-9 Score for Depression 3   Initial [facility] Survey score.  Quality of Life:   If total score > 25 review individual areas where patient rated a 4 or 5.  Consider patients current medical condition and what role that plays on the score.   Adjust treatment protocol to improve areas of concern.  Consider the following:  PHQ9 score, DASI, and re-assessment within the next 30 days to assist with developing treatments.  21   [facility]   Stages of Change Action   Interventions Planned Patient to verbalize understanding of behavioral assessment results;Patient to verbalize understanding of negative impact of stress to personal health;Patient interested in implementing one strategy to reduce current level of stress/anxiety;Patient will recognize signs and symptoms of depression;Patient to attend stress management class(es);Patient will practice coping/stress management techniques   Interventions In Progress or Completed Patient verbalizes understanding of behavioral assessment scores;Patient verbalizes understanding of negative impact of stress to personal health;Pt recognizes signs and symptoms of depression;Patient continues to practice/follow through with strategies to reduce stress and/or anxiety level   Psychosocial  "Target Outcome Identify absence or presence of depression using valid screening tool;Maximize coping skills;Develop positive support system   Other Core Components - Hypertension   History of or Diagnosis of Hypertension No   Other Core Components - Tobacco   History of Tobacco Use Never   Other Core Components Summary   Interventions Planned Instruct patient on the DASH diet;Attend education class on Blood Pressure   Activity/Exercise History   Activity/Exercise Assessment Re-assessment   Activity/Exercise Status prior to event? Was Physically Active   Number of Days Currently participating in Moderate Physical Activity? 3   Number of Days Currently performing  Aerobic Exercise (including rehab)? 4   Number of Minutes per Session Currently of Aerobic Exercise (average)? 30   Current Stage of Change (Physical Activity) Action   Current Stage of Change (Aerobic Exercise) Action   Patient Goals Goal #1;Goal #2   Goal #1 Description Pt. will walk at a moderate pace for at least 30 minutes and progress exertion to level that stair climbing is not challenging, as well as to decr. fatigue at end of day, with a stable cv response to exercise    Goal #1 Target Date 09/22/17   Goal #1 Date Met 08/18/17   Goal #1 Progress Towards Goal 8/18 Pt has met this goal, he states he climbs 10-15 sets of stairs per day and tolerates it well, reports energy is back. Has reached ex. MET level 4.9   Goal #2 Description Pt. will establish indep muscle conditioning with a stable cv response to exercise.    Goal #2 Target Date 09/22/17   Goal #2 Date Met 08/18/17   Goal #2 Progress Towards Goal 8/18 continues to use wts for str. training. States he has learned that his past ex program was not \"pushing hard enough to get the benefit he sees now\"   Exercise Assessment   6 Minute Walk Predicted - Gender Selection Male   6 Minute Walk Predicted (Male) 1598.79   6 Minute Walk Predicted (Female) 1387.52   Initial 6 Minute Walk Distance (Feet) 973 " ft   Discharge 6 Minute Walk Distance (Feet) 1659   Resting HR 68 bpm   Exercise  bpm   Post Exercise HR 70 bpm   Resting /86   Exercise /68   Post Exercise /72   Effort Rating 4   Current MET Level 4.9   MET Level Goal 5   ECG Rhythm Sinus rhythm   Ectopy PACs;PVCs   Current Symptoms Dyspnea;Fatigue   Limitations/Restrictions Sternal Precautions   Exercise Prescription   Mode Treadmill;Nustep;Weights   Duration/Time 15-30 min   Frequency 3 daysweek   THR (85% of age predicted max HR) 123.25   OMNI Effort Rating (0-10 Scale) 4-6/10   Progression Intermittent bouts;Aerobic exercise to OMNI rating of 6 or below and at or below THR   Recommended Home Exercise   Type of Exercise Walking   Frequency (days per week) 2-3x week on off days from rehab   Duration (minutes per session) Intermittent   Effort Rating Recommended 4-6/10   30 Day Exercise Plan progress duration to continuous bouts up to 30 min, then intensity per RPE and cv response   Current Home Exercise   Type of Exercise Walking   Frequency (days per week) daily   Duration (minutes per session) 15 min   Follow-up/On-going Support   Provider follow-up needed on the following No follow-up needed   Learning Assessment   Learner Patient   Primary Language English   Preferred Learning Style Listening;Reading;Demonstration;Pictures/Video   Barriers to Learning No barriers noted   Patient Education   Education recommended Anatomy and Physiology of the Heart;Exercise Principles;Medication Overview;Muscle Conditioning;Nutrition;Stress Management   Education classes attended Anatomy and Physiology of the Heart;Blood Pressure;Exercise Principles;Medication Overview;Muscle Conditioning;Nutrition;Stress Management

## 2022-02-15 ENCOUNTER — TELEPHONE (OUTPATIENT)
Dept: NUCLEAR MEDICINE | Facility: CLINIC | Age: 82
End: 2022-02-15

## 2022-03-01 ENCOUNTER — ANCILLARY PROCEDURE (OUTPATIENT)
Dept: NUCLEAR MEDICINE | Facility: CLINIC | Age: 82
End: 2022-03-01
Attending: PSYCHIATRY & NEUROLOGY
Payer: MEDICARE

## 2022-03-01 DIAGNOSIS — G20.A1 PARKINSON'S DISEASE (H): ICD-10-CM

## 2022-03-01 PROCEDURE — 78803 RP LOCLZJ TUM SPECT 1 AREA: CPT

## 2022-03-01 PROCEDURE — A9584 IODINE I-123 IOFLUPANE: HCPCS
